# Patient Record
(demographics unavailable — no encounter records)

---

## 2025-01-19 NOTE — HISTORY OF PRESENT ILLNESS
[FreeTextEntry1] : 21 yo g0 meds-ocp , lexapro, propranolol and ritalin here for annual complaining of weight gain since marriage-hormones felt best on nuvaring but didnt like that she couldn't extend  planning on bc till summer

## 2025-01-19 NOTE — PLAN
[FreeTextEntry1] : annual-gc/ch wants thyroid testing has fh of thyroid disease options bc rereviewed , ocp, nuvaring, depo, nexplanon and iud will got back to nuvaring f/u 1 yr prn  Pt seen with [facility] Noguera.  I have read and agree with above note. i have made the plan for this pt.   Helder